# Patient Record
(demographics unavailable — no encounter records)

---

## 2017-07-02 NOTE — NUR
PATIENT PRESENTS TO ED WITH C/O LAC TO LEFT MIDDLE FINGER 

PT DENIES N/V/D; SKIN IS PINK/WARM/DRY; AAOX4 WITH EVEN AND STEADY GAIT; LUNGS 
CLEAR BL; HR EVEN AND REGULAR; PT DENIES ANY FEVER, CP, SOB, OR COUGH AT THIS 
TIME; PATIENT STATES PAIN OF 0/10 AT THIS TIME; VSS; PATIENT POSITIONED FOR 
COMFORT; HOB ELEVATED; BEDRAILS UP X2; BED DOWN. ER MD MADE AWARE OF PT STATUS.

## 2019-04-30 NOTE — NUR
Patient discharged with v/s stable. Written and verbal after care instructions 
given and explained. Patient alert, oriented and verbalized understanding of 
instructions. Ambulatory with steady gait. All questions addressed prior to 
discharge. ID band removed. Patient advised to follow up with PMD. Rx of 
XARELTO 15MG given. Patient educated on indication of medication including 
possible reaction and side effects. Opportunity to ask questions provided and 
answered.